# Patient Record
Sex: MALE | Race: WHITE
[De-identification: names, ages, dates, MRNs, and addresses within clinical notes are randomized per-mention and may not be internally consistent; named-entity substitution may affect disease eponyms.]

---

## 2021-02-04 PROBLEM — Z00.00 ENCOUNTER FOR PREVENTIVE HEALTH EXAMINATION: Status: ACTIVE | Noted: 2021-02-04

## 2021-02-05 ENCOUNTER — APPOINTMENT (OUTPATIENT)
Dept: VASCULAR SURGERY | Facility: CLINIC | Age: 56
End: 2021-02-05
Payer: COMMERCIAL

## 2021-02-05 PROCEDURE — 99203 OFFICE O/P NEW LOW 30 MIN: CPT

## 2021-02-05 PROCEDURE — 99072 ADDL SUPL MATRL&STAF TM PHE: CPT

## 2021-02-08 NOTE — HISTORY OF PRESENT ILLNESS
[FreeTextEntry1] : Mr. Kofi Segovia is a 55 year old man presenting to the vascular clinic after referral by his PCP Dr. Cooper for evaluation of Raynaud's. Mr. Segovia states he has no PMH apart from hypertension. Patient reports that he generally has poor circulation in his upper arms and gets cold easily, however [place] when riding the subway, he noted that his right index finger became acutely cold, painful, and turned pale white. This was the first time it had ever happened and he has no known history of vascular disease. He is a never smoker. He states that he is otherwise very active, has a healthy diet, and is overall healthy. He denies any symptoms of new or recent hand pain, hand fatigue with exertion, hand sensory changes, or hand motor weakness.

## 2021-02-08 NOTE — ASSESSMENT
[FreeTextEntry1] : Mr. Segovia is a 55 year old man with no known vascular history presenting to the vascular clinic for initial evaluation after he developed sudden onset right 2nd finger paleness that has since resolved; on exam he has bounding radial and ulnar pulses and no sensorimotor deficits.\par \par Plan\par 1. No acute vascular intervention is indicated at this time, patient likely has Raynaud's secondary to arterial vasospasm. Discussed with patient that likely diagnosis is Raynaud's secondary to vasospasm and counseled patient about symptom management including warming hands and otherwise benign course of disease. Patient understands and will follow up if new or worsening of symptoms occurs.

## 2021-02-08 NOTE — PHYSICAL EXAM
[Respiratory Effort] : normal respiratory effort [Normal Rate and Rhythm] : normal rate and rhythm [2+] : left 2+ [No Rash or Lesion] : No rash or lesion [Alert] : alert [Calm] : calm [Varicose Veins Of Lower Extremities] : not present [] : not present [Abdomen Tenderness] : ~T ~M No abdominal tenderness [de-identified] : Healthy appearing man in no acute distress.

## 2021-02-08 NOTE — ADDENDUM
[FreeTextEntry1] : This note was written by Karen Bee on 02/05/2021 acting as scribe for Tanvi Chung M.D.  I, Tanvi Smart have read and attest that all the information, medical decision making and discharge instructions within are true and accurate.